# Patient Record
Sex: MALE | Race: WHITE | Employment: FULL TIME | URBAN - METROPOLITAN AREA
[De-identification: names, ages, dates, MRNs, and addresses within clinical notes are randomized per-mention and may not be internally consistent; named-entity substitution may affect disease eponyms.]

---

## 2023-11-15 ENCOUNTER — OFFICE VISIT (OUTPATIENT)
Dept: URGENT CARE | Facility: URGENT CARE | Age: 28
End: 2023-11-15
Payer: COMMERCIAL

## 2023-11-15 VITALS
DIASTOLIC BLOOD PRESSURE: 73 MMHG | TEMPERATURE: 98.1 F | HEART RATE: 62 BPM | OXYGEN SATURATION: 100 % | RESPIRATION RATE: 16 BRPM | WEIGHT: 179 LBS | SYSTOLIC BLOOD PRESSURE: 113 MMHG

## 2023-11-15 DIAGNOSIS — K59.00 CONSTIPATION, UNSPECIFIED CONSTIPATION TYPE: ICD-10-CM

## 2023-11-15 DIAGNOSIS — M54.50 ACUTE LEFT-SIDED LOW BACK PAIN WITHOUT SCIATICA: Primary | ICD-10-CM

## 2023-11-15 LAB
ALBUMIN UR-MCNC: NEGATIVE MG/DL
APPEARANCE UR: CLEAR
BILIRUB UR QL STRIP: NEGATIVE
COLOR UR AUTO: YELLOW
GLUCOSE UR STRIP-MCNC: NEGATIVE MG/DL
HGB UR QL STRIP: NEGATIVE
KETONES UR STRIP-MCNC: NEGATIVE MG/DL
LEUKOCYTE ESTERASE UR QL STRIP: NEGATIVE
NITRATE UR QL: NEGATIVE
PH UR STRIP: 5.5 [PH] (ref 5–7)
SP GR UR STRIP: >=1.03 (ref 1–1.03)
UROBILINOGEN UR STRIP-ACNC: 0.2 E.U./DL

## 2023-11-15 PROCEDURE — 99203 OFFICE O/P NEW LOW 30 MIN: CPT | Performed by: PHYSICIAN ASSISTANT

## 2023-11-15 PROCEDURE — 81003 URINALYSIS AUTO W/O SCOPE: CPT | Performed by: PHYSICIAN ASSISTANT

## 2023-11-15 PROCEDURE — 87086 URINE CULTURE/COLONY COUNT: CPT | Performed by: PHYSICIAN ASSISTANT

## 2023-11-15 NOTE — PROGRESS NOTES
"Assessment & Plan     Acute left-sided low back pain without sciatica  - UA Macroscopic with reflex to Microscopic and Culture; Future  - UA Macroscopic with reflex to Microscopic and Culture  - Urine Culture Aerobic Bacterial; Future  - Urine Culture Aerobic Bacterial    Constipation, unspecified constipation type      UA looks normal today.  Culture was ordered to confirm that there is no infection.  We discussed that constipation certainly could be contributing to his symptoms.  I recommend that he repeat the bowel cleanout with MiraLAX.  He states that he has instructions and MiraLAX at home.  We discussed symptomatic measures for low back pain.  Heat, ice, ibuprofen. Establish with primary care provider and discuss any persistent symptoms.    Return in about 2 weeks (around 11/29/2023) for Physical Exam with primary care provider.     Oralia Hitchcock PA-C  [facility]    Subjective   José Gee is a 28 year old who presents for the following health issues     Patient presents with:  Urgent Care  Musculoskeletal Problem: Per patient states for the past two months he has been having low back discomfort at times, when urinating takes him longer to void, when he eats spicy or has acidic drinks like soda has low abdominal discomfort, states girlfriends mother finally convinced him to come in and be seen does not have a primary care provider       HPI    José presents to clinic today for evaluation of pain in his left lower back.  He had symptoms for 2 months.  He describes the pain as a burning sensation as if \"someone is holding a blow torch to it.\"  Yesterday he had some body aches.  He has had no dysuria but states that he sometimes goes to the bathroom and has a good stream but then leaks urine afterward. He does have a history of similar symptoms with constipation.  His last bowel movement was 2 days ago and was loose.    Review of Systems   ROS negative except as stated " above.      Objective    /73   Pulse 62   Temp 98.1  F (36.7  C) (Tympanic)   Resp 16   Wt 81.2 kg (179 lb)   SpO2 100%   Physical Exam   GENERAL: healthy, alert and no distress  NECK: no adenopathy, no asymmetry, masses, or scars and thyroid normal to palpation  RESP: lungs clear to auscultation - no rales, rhonchi or wheezes  CV: regular rate and rhythm, normal S1 S2, no S3 or S4, no murmur, click or rub, no peripheral edema and peripheral pulses strong  MS: no gross musculoskeletal defects noted, no edema  BACK: no CVA tenderness to percussion, some paralumbar pain to palpation.    Results for orders placed or performed in visit on 11/15/23   UA Macroscopic with reflex to Microscopic and Culture     Status: Normal    Specimen: Urine, Clean Catch   Result Value Ref Range    Color Urine Yellow Colorless, Straw, Light Yellow, Yellow    Appearance Urine Clear Clear    Glucose Urine Negative Negative mg/dL    Bilirubin Urine Negative Negative    Ketones Urine Negative Negative mg/dL    Specific Gravity Urine >=1.030 1.003 - 1.035    Blood Urine Negative Negative    pH Urine 5.5 5.0 - 7.0    Protein Albumin Urine Negative Negative mg/dL    Urobilinogen Urine 0.2 0.2, 1.0 E.U./dL    Nitrite Urine Negative Negative    Leukocyte Esterase Urine Negative Negative    Narrative    Microscopic not indicated

## 2023-11-15 NOTE — PATIENT INSTRUCTIONS
Drink plenty of fluids  Ice your injury for 10-15 minutes, 3-4 times a day  Heat to your injury for 10-15 minutes, 3-4 times a day  Ibuprofen 600mg twice daily for at least 1 week  Do not rest for more than 48 hours after injury/onset of pain.  Gentle stretching and light activity is important.   Stretch back with range of motion exercises.  Proper lifting with avoidance of heavy lifting discussed.   Follow up with primary care provider if these symptoms worsen or fail to improve as anticipated.

## 2023-11-15 NOTE — LETTER
[facility] [place]  [address] [address] [facility] [address]  Phone: [phone]    November 15, 2023        José URBAN Gerard  [address]  [facility] [address]          To whom it may concern:    RE: José Gee    Patient was seen and treated today at our clinic. Please excuse him from work missed November 14 and 15.    Please contact me for questions or concerns.      Sincerely,        Oralia Hitchcock PA-C

## 2023-11-16 LAB — BACTERIA UR CULT: NO GROWTH

## 2024-02-12 ENCOUNTER — OFFICE VISIT (OUTPATIENT)
Dept: URGENT CARE | Facility: URGENT CARE | Age: 29
End: 2024-02-12
Payer: COMMERCIAL

## 2024-02-12 VITALS
HEART RATE: 73 BPM | DIASTOLIC BLOOD PRESSURE: 79 MMHG | OXYGEN SATURATION: 99 % | SYSTOLIC BLOOD PRESSURE: 114 MMHG | WEIGHT: 183.8 LBS | TEMPERATURE: 99.2 F | RESPIRATION RATE: 16 BRPM

## 2024-02-12 DIAGNOSIS — S61.452A CAT BITE OF LEFT HAND, INITIAL ENCOUNTER: Primary | ICD-10-CM
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
## 2024-02-12 PROCEDURE — 90715 TDAP VACCINE 7 YRS/> IM: CPT

## 2024-02-12 PROCEDURE — 99213 OFFICE O/P EST LOW 20 MIN: CPT | Mod: 25

## 2024-02-12 PROCEDURE — 90471 IMMUNIZATION ADMIN: CPT

## 2024-02-12 NOTE — LETTER
February 12, 2024      José URBAN Strempke  [address] [place] [place] [address]        To Whom It May Concern:    José URBAN Strempke  was seen on February 12, 2024.  Please excuse him from lifting more than two pounds for one week until February 19th due to injury.        Sincerely,        AMBROCIO Marshall CNP

## 2024-02-12 NOTE — PROGRESS NOTES
ASSESSMENT:  (S61.452A,  W55.01XA) Cat bite of left hand, initial encounter  (primary encounter diagnosis)  Plan: TDAP 7+ (ADACEL,BOOSTRIX),         amoxicillin-clavulanate (AUGMENTIN) 875-125 MG         tablet      PLAN:  We discussed that due to the clinical presentation today, recommend antibiotic for infection of left hand. Discussed to take the antibiotic for the full course and may try probiotics to help prevent diarrhea.   May use tylenol and/or ibuprofen as needed for pain and low grade fever.    We discussed to continue monitoring and return to the clinic or emergency department if increase in fever, chills, nausea, vomiting, increase in pain or swelling of left hand.  Work note provided.      Tetanus given in office today.     Animal bite education provided within the patient instructions.   Patient acknowledged understanding of the above plan.     Bree Lindsay, MARILEE Student on 2/12/2024 at 6:28 PM    Physician Attestation   I agree with the information in this note.    AMBROCIO Marshall CNP    SUBJECTIVE:  José Gee is a 28 year old  male who presents with a chief complaint of an animal bite on the left hand.  He was bitten by a cat on Saturday around 11pm. Cat was spooked by the dogs at the home.    Cicumstances of bite: unprovoked attack.  Severity: bite with skin break.  Animal's immunizations up to date  Associated symptoms: immediate pain, redness noted, increasing pain, swelling and edema at site, and weakness of left hand. Reports some numbness and tingling of left hand at area.   Liquid  for work.     Last tetanus booster in 2011.     ROS:  Negative except noted above.     OBJECTIVE:  Blood pressure 114/79, pulse 73, temperature 99.2  F (37.3  C), temperature source Oral, resp. rate 16, weight 83.4 kg (183 lb 12.8 oz), SpO2 99%.   GENERAL: healthy, alert no acute distress  SKIN: Left dorsal aspect of hand with multiple puncture wounds, erythema, edema, tenderness and  warmth to palpation, and decreased ROM due to pain and swelling. Strength of left hand decreased.  CMS intact distally.  NEURO: Normal strength and tone, sensory exam grossly normal,  normal speech and mentation

## 2024-02-12 NOTE — LETTER
February 12, 2024      José Gee  [address] [facility] [address]        To Whom It May Concern:    José Gee  was seen on February 12, 2024.  Please excuse him with weight restrictions at work until left hand swelling goes down and able to move freely due to injury.        Sincerely,        AMBROCIO Marshall CNP

## 2024-02-13 NOTE — PATIENT INSTRUCTIONS
You were given a tetanus booster today    Take the antibiotic as prescribed and finish the full course even if symptoms improve.   Try yogurt with active cultures or probiotics such as Culturelle daily to help prevent diarrhea while using antibiotics.    Get plenty of rest and drink fluids.  Can use Tylenol and/or ibuprofen as needed for pain and fever.  Maximum dose of Tylenol is 4000mg in a 24 hour period of time.  Take ibuprofen with food to avoid stomach upset.      Keep area clean and dry.   Warm soapy soaks as needed to keep area clean.     Continue monitoring and return to the clinic or emergency department if increase in fever, chills, nausea, vomiting, increase in pain or swelling of left hand.